# Patient Record
Sex: FEMALE | Race: WHITE | URBAN - METROPOLITAN AREA
[De-identification: names, ages, dates, MRNs, and addresses within clinical notes are randomized per-mention and may not be internally consistent; named-entity substitution may affect disease eponyms.]

---

## 2017-04-27 ENCOUNTER — PRE VISIT (OUTPATIENT)
Dept: ORTHOPEDICS | Facility: CLINIC | Age: 77
End: 2017-04-27

## 2017-04-27 NOTE — TELEPHONE ENCOUNTER
1.  Date/reason for appt:  8/09/17   Scoliosis    2.  Referring provider:  Self    3.  Call to patient (Yes / No - short description):  No, transferred from CC.    PCP=Leda    Imaging=ULISSES    Physiatrist=Dr Kye Gonsalez    Mailed JOEL's to home address per request.

## 2017-05-17 NOTE — TELEPHONE ENCOUNTER
Radiology reports received from [facility]. Notified Abi to pull images.  MR lumbar on 4/24/17, 2/10/14  MR thoracic on 4/24/17  MR lumbar on 4/27/16

## 2017-05-22 NOTE — TELEPHONE ENCOUNTER
Records received from [facility]. Records on right brachial plexus.   Included  Office notes: 6/16/14, 11/28/14, 3/2/16, 6/7/16, 7/11/16, 10/17/16, 12/26/16, 2/28/17, 4/10/17(PT)  Radiology reports: MR lumbar on 4/24/17([facility] duplicate)   MR thoracic on 4/245/14([facility] duplicate)  Other: labs

## 2017-05-24 NOTE — TELEPHONE ENCOUNTER
Records received from Dr. Fishman's office.   Included  Office notes: 1/18/17, 10/19/16, 8/15/16, 6/14/16, 5/9/16, 4/6/16  Radiology reports: MR lumbar on 4/27/16-[facility]   Cervical on 4/6/16-[facility]    Thoracic on 4/6/16-[facility]    Lumbar on 4/6/16-[facility]    Pelvis on 4/1/16-[facility]   Op/Procedure notes: left hip subcapsular hip injection 7/8/16    Missing/needed records: [facility]

## 2017-08-09 ENCOUNTER — OFFICE VISIT (OUTPATIENT)
Dept: ORTHOPEDICS | Facility: CLINIC | Age: 77
End: 2017-08-09

## 2017-08-09 VITALS — BODY MASS INDEX: 25.66 KG/M2 | WEIGHT: 144.8 LBS | HEIGHT: 63 IN

## 2017-08-09 DIAGNOSIS — M41.26 OTHER IDIOPATHIC SCOLIOSIS, LUMBAR REGION: Primary | ICD-10-CM

## 2017-08-09 RX ORDER — OXYCODONE AND ACETAMINOPHEN 10; 325 MG/1; MG/1
2 TABLET ORAL EVERY 6 HOURS PRN
COMMUNITY
End: 2017-12-11
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
## 2017-08-09 NOTE — MR AVS SNAPSHOT
"              After Visit Summary   2017    Prisca Connolly    MRN: [id number]           Patient Information     Date Of Birth          1940        Visit Information        Provider Department      2017 1:00 PM Ted Lei MD [facility]        Today's Diagnoses     Other idiopathic scoliosis, lumbar region    -  1       Follow-ups after your visit        Who to contact     Please call your clinic at [phone] to:    Ask questions about your health    Make or cancel appointments    Discuss your medicines    Learn about your test results    Speak to your doctor   If you have compliments or concerns about an experience at your clinic, or if you wish to file a complaint, please contact [facility] Patient Relations at [phone] or email us at [email or web address]         Additional Information About Your Visit        MyChart Information     [employer] is an electronic gateway that provides easy, online access to your medical records. With [employer], you can request a clinic appointment, read your test results, renew a prescription or communicate with your care team.     To sign up for [email or web address] visit the website at [email or web address]   You will be asked to enter the access code listed below, as well as some personal information. Please follow the directions to create your username and password.     Your access code is: [email or web address]  Expires: 10/24/2017  6:30 AM     Your access code will  in 90 days. If you need help or a new code, please contact your [facility] or call [phone] for assistance.        Care EveryWhere ID     This is your Care EveryWhere ID. This could be used by other organizations to access your [place] medical records  [id number]        Your Vitals Were     Height BMI (Body Mass Index)                1.6 m (5' 2.99\") 25.66 kg/m2           Blood Pressure from Last 3 " Encounters:   No data found for BP    Weight from Last 3 Encounters:   08/09/17 65.7 kg (144 lb 12.8 oz)               Primary Care Provider    None Specified       No primary provider on file.        Equal Access to Services     BORA ACOSTA : Hadii aad ku hadkentrell Downs, abidada luindira, ezrata kaalejandro dietrich, lisa tripp radhashirley patel angy woodward. So [place] -[phone].    ATENCIÓN: Si habla español, tiene a santos disposición servicios gratuitos de asistencia lingüística. Llame al [phone].    We comply with applicable federal civil rights laws and [place] laws. We do not discriminate on the basis of race, color, national origin, age, disability sex, sexual orientation or gender identity.            Thank you!     Thank you for choosing [facility]  for your care. Our goal is always to provide you with excellent care. Hearing back from our patients is one way we can continue to improve our services. Please take a few minutes to complete the written survey that you may receive in the mail after your visit with us. Thank you!             Your Updated Medication List - Protect others around you: Learn how to safely use, store and throw away your medicines at www.disposemymeds.org.          This list is accurate as of: 8/9/17 11:59 PM.  Always use your most recent med list.                   Brand Name Dispense Instructions for use Diagnosis    ASPIRIN PO      Take 325 mg by mouth daily        LEVOTHYROXINE SODIUM PO      Take 50 mcg by mouth daily        LYRICA PO      Take 300 mg by mouth 2 times daily        oxyCODONE-acetaminophen  MG per tablet    PERCOCET     Take 2 tablets by mouth every 6 hours as needed for moderate to severe pain
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
## 2017-08-10 NOTE — PROGRESS NOTES
Radiology called about incidental finding on XR Left lung nodule.    Informed  who ordered to notify primary MD for F/U.  I called patient & informed her & her sister & Son Cisco who was here at appt.   & son Gera.  Informed them of finding that needs workup by primary & they agreed with that plan.  I called & informed MAX Anthony for Primary DR.Phillip Hilliard at [address]  [place] [address] ph# [phone]  Fax# [phone]  Who was in clinic with  at the time & she will inform him.  Faxed XR report & dictation to DR. Hilliard & Tiki Alex will  Transfer or mail disc of imaging.  Call back prn.  They agreed.  MOLLY./Kristi Nice RN.

## 2017-12-11 ENCOUNTER — OFFICE VISIT (OUTPATIENT)
Dept: NEUROSURGERY | Facility: CLINIC | Age: 77
End: 2017-12-11

## 2017-12-11 VITALS
HEIGHT: 63 IN | TEMPERATURE: 98.2 F | BODY MASS INDEX: 24.86 KG/M2 | OXYGEN SATURATION: 94 % | HEART RATE: 77 BPM | RESPIRATION RATE: 24 BRPM | SYSTOLIC BLOOD PRESSURE: 134 MMHG | DIASTOLIC BLOOD PRESSURE: 74 MMHG | WEIGHT: 140.3 LBS

## 2017-12-11 DIAGNOSIS — M79.2 NEURALGIA: ICD-10-CM

## 2017-12-11 DIAGNOSIS — G89.21 CHRONIC PAIN DUE TO INJURY: ICD-10-CM

## 2017-12-11 DIAGNOSIS — G54.0 BRACHIAL PLEXOPATHY: Primary | ICD-10-CM

## 2017-12-11 PROBLEM — R91.8 MULTIPLE LUNG NODULES: Status: ACTIVE | Noted: 2017-08-15

## 2017-12-11 PROBLEM — R91.1 LUNG NODULE: Status: ACTIVE | Noted: 2017-08-10

## 2017-12-11 RX ORDER — NITROGLYCERIN 0.4 MG/1
0.4 TABLET SUBLINGUAL PRN
COMMUNITY
Start: 2016-06-28

## 2017-12-11 RX ORDER — METHADONE HYDROCHLORIDE 10 MG/1
2 TABLET ORAL 3 TIMES DAILY PRN
COMMUNITY
Start: 2017-11-16

## 2017-12-11 ASSESSMENT — PAIN SCALES - GENERAL: PAINLEVEL: MODERATE PAIN (5)
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
## 2017-12-11 NOTE — MR AVS SNAPSHOT
After Visit Summary   2017    Prisca Connolly    MRN: [id number]           Patient Information     Date Of Birth          1940        Visit Information        Provider Department      2017 1:00 PM Laz Crenshaw MD [facility] Neurosurgery        Today's Diagnoses     Brachial plexopathy    -  1    Neuralgia        Chronic pain due to injury           Follow-ups after your visit        Who to contact     Please call your clinic at [phone] to:    Ask questions about your health    Make or cancel appointments    Discuss your medicines    Learn about your test results    Speak to your doctor   If you have compliments or concerns about an experience at your clinic, or if you wish to file a complaint, please contact [facility] Patient Relations at [phone] or email us at [email or web address]         Additional Information About Your Visit        MyChart Information     [employer] is an electronic gateway that provides easy, online access to your medical records. With [employer], you can request a clinic appointment, read your test results, renew a prescription or communicate with your care team.     To sign up for [employer] visit the website at [email or web address]   You will be asked to enter the access code listed below, as well as some personal information. Please follow the directions to create your username and password.     Your access code is: [email or web address]  Expires: 3/7/2018  6:30 AM     Your access code will  in 90 days. If you need help or a new code, please contact your [facility] or call [phone] for assistance.        Care EveryWhere ID     This is your Care EveryWhere ID. This could be used by other organizations to access your [place] medical records  [id number]        Your Vitals Were     Pulse Temperature Respirations Height Pulse Oximetry Breastfeeding?    77 98.2  F (36.8  " C) (Oral) 24 1.6 m (5' 2.99\") 94% No    BMI (Body Mass Index)                   24.86 kg/m2            Blood Pressure from Last 3 Encounters:   12/11/17 134/74    Weight from Last 3 Encounters:   12/11/17 63.6 kg (140 lb 4.8 oz)   08/09/17 65.7 kg (144 lb 12.8 oz)              Today, you had the following     No orders found for display       Primary Care Provider Office Phone # Fax #    Ted Cedeno [phone]       [address] [place]ERS [address] [place] [id number]        Equal Access to Services     [facility]: Hadii lauren louis hadasho Sobandar, waaxda luqadaha, qaybta kaalmada adeshirleyyada, lisa travis . So [facility] [phone].    ATENCIÓN: Si habla español, tiene a santos disposición servicios gratuitos de asistencia lingüística. Ll[facility] [phone].    We comply with applicable federal civil rights laws and [place] laws. We do not discriminate on the basis of race, color, national origin, age, disability, sex, sexual orientation, or gender identity.            Thank you!     Thank you for choosing [facility]  for your care. Our goal is always to provide you with excellent care. Hearing back from our patients is one way we can continue to improve our services. Please take a few minutes to complete the written survey that you may receive in the mail after your visit with us. Thank you!             Your Updated Medication List - Protect others around you: Learn how to safely use, store and throw away your medicines at www.disposemymeds.org.          This list is accurate as of: 12/11/17  2:44 PM.  Always use your most recent med list.                   Brand Name Dispense Instructions for use Diagnosis    ASPIRIN PO      Take 325 mg by mouth daily        LEVOTHYROXINE SODIUM PO      Take 50 mcg by mouth daily        LYRICA PO      Take 300 mg by mouth 2 times daily        methadone 10 MG tablet    DOLOPHINE     Take 2 tablets by mouth 3 times " daily as needed        nitroGLYcerin 0.4 MG sublingual tablet    NITROSTAT     Place 0.4 mg under the tongue as needed
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
## 2017-12-11 NOTE — NURSING NOTE
Chief Complaint   Patient presents with     Consult     [facility]- PAIN IN RIGHT ARM-DMIRTY Rosario, CMA

## 2022-01-01 ENCOUNTER — TELEPHONE (OUTPATIENT)
Dept: ANESTHESIOLOGY | Facility: CLINIC | Age: 82
End: 2022-01-01

## 2022-01-01 ENCOUNTER — MEDICAL CORRESPONDENCE (OUTPATIENT)
Dept: HEALTH INFORMATION MANAGEMENT | Facility: CLINIC | Age: 82
End: 2022-01-01